# Patient Record
(demographics unavailable — no encounter records)

---

## 2024-10-25 NOTE — PLAN
[FreeTextEntry1] : 60-year-old gentleman presents for evaluation and medication reconciliation Vitamin D deficiency-renewal of 50,000 units on a weekly basis Metabolic syndrome-gradual weight loss on Zepbound he has been on 10 mg but feels it is too strong he was having sensation of lightheadedness with headaches the dose is lowered to 7.5 mg Hyperlipidemia-periodic lipid panels to evaluate function

## 2025-04-11 NOTE — HEALTH RISK ASSESSMENT
[0] : 2) Feeling down, depressed, or hopeless: Not at all (0) [PHQ-2 Negative - No further assessment needed] : PHQ-2 Negative - No further assessment needed [[id number]] : 0

## 2025-07-17 NOTE — ASSESSMENT
[FreeTextEntry1] : 61M here for eval of left leg pain. Hx. HLD (no statin), obesity on Zepbound, Vit. D deficiency. 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 Patient states he has had a few weeks of severe left leg pain and wants to be check for tick borne illness. It occurs at least once per day. Denies injury/trauma. Pain is intermittent, no trigger, mild alleviation with Aleve. Rates pain 8/10 in severity. It runs down from the thigh into the calf, states it feels muscular. No rash, swelling, or redness. Negative SLR Test.   Leg Pain- trial of robaxin, c/w NSAID, complete Xrays, tick borne labs drawn, along with comprehensive labs. Consider Ortho and PT eval. Future MRI if pain persists.  Vit. D Deficiency- refilled med  Obesity- Takes Zepbound 7.5 mg/month, maintenance